# Patient Record
Sex: MALE | Race: WHITE
[De-identification: names, ages, dates, MRNs, and addresses within clinical notes are randomized per-mention and may not be internally consistent; named-entity substitution may affect disease eponyms.]

---

## 2021-01-12 ENCOUNTER — HOSPITAL ENCOUNTER (OUTPATIENT)
Dept: HOSPITAL 79 - CT | Age: 66
End: 2021-01-12
Attending: NURSE PRACTITIONER
Payer: MEDICARE

## 2021-01-12 DIAGNOSIS — N20.0: ICD-10-CM

## 2021-01-12 DIAGNOSIS — I71.4: Primary | ICD-10-CM

## 2021-05-12 ENCOUNTER — HOSPITAL ENCOUNTER (OUTPATIENT)
Dept: HOSPITAL 79 - KOH-I | Age: 66
End: 2021-05-12
Attending: FAMILY MEDICINE
Payer: MEDICARE

## 2021-05-12 DIAGNOSIS — M47.816: ICD-10-CM

## 2021-05-12 DIAGNOSIS — M47.814: ICD-10-CM

## 2021-05-12 DIAGNOSIS — M54.5: Primary | ICD-10-CM

## 2021-08-11 ENCOUNTER — HOSPITAL ENCOUNTER (INPATIENT)
Dept: HOSPITAL 79 - ER1 | Age: 66
LOS: 30 days | DRG: 870 | End: 2021-09-10
Attending: SURGERY | Admitting: EMERGENCY MEDICINE
Payer: MEDICARE

## 2021-08-11 ENCOUNTER — HOSPITAL ENCOUNTER (EMERGENCY)
Dept: HOSPITAL 79 - ER1 | Age: 66
Discharge: HOME | End: 2021-08-11
Payer: MEDICARE

## 2021-08-11 VITALS — HEIGHT: 65 IN | BODY MASS INDEX: 44.81 KG/M2 | WEIGHT: 268.96 LBS

## 2021-08-11 VITALS — HEIGHT: 63 IN | BODY MASS INDEX: 38.98 KG/M2 | WEIGHT: 220 LBS

## 2021-08-11 DIAGNOSIS — E87.0: ICD-10-CM

## 2021-08-11 DIAGNOSIS — E86.0: ICD-10-CM

## 2021-08-11 DIAGNOSIS — E03.9: ICD-10-CM

## 2021-08-11 DIAGNOSIS — N17.0: ICD-10-CM

## 2021-08-11 DIAGNOSIS — T45.515A: ICD-10-CM

## 2021-08-11 DIAGNOSIS — G93.41: ICD-10-CM

## 2021-08-11 DIAGNOSIS — E11.65: ICD-10-CM

## 2021-08-11 DIAGNOSIS — D63.1: ICD-10-CM

## 2021-08-11 DIAGNOSIS — T38.0X5A: ICD-10-CM

## 2021-08-11 DIAGNOSIS — J12.82: ICD-10-CM

## 2021-08-11 DIAGNOSIS — I12.9: ICD-10-CM

## 2021-08-11 DIAGNOSIS — I08.1: ICD-10-CM

## 2021-08-11 DIAGNOSIS — Z79.890: ICD-10-CM

## 2021-08-11 DIAGNOSIS — A41.89: ICD-10-CM

## 2021-08-11 DIAGNOSIS — J15.6: ICD-10-CM

## 2021-08-11 DIAGNOSIS — D69.3: ICD-10-CM

## 2021-08-11 DIAGNOSIS — R65.20: ICD-10-CM

## 2021-08-11 DIAGNOSIS — U07.1: ICD-10-CM

## 2021-08-11 DIAGNOSIS — E66.01: ICD-10-CM

## 2021-08-11 DIAGNOSIS — F41.9: ICD-10-CM

## 2021-08-11 DIAGNOSIS — N40.0: ICD-10-CM

## 2021-08-11 DIAGNOSIS — J80: ICD-10-CM

## 2021-08-11 DIAGNOSIS — A41.59: Primary | ICD-10-CM

## 2021-08-11 DIAGNOSIS — L89.152: ICD-10-CM

## 2021-08-11 DIAGNOSIS — C90.30: ICD-10-CM

## 2021-08-11 DIAGNOSIS — Z82.49: ICD-10-CM

## 2021-08-11 DIAGNOSIS — E87.8: ICD-10-CM

## 2021-08-11 DIAGNOSIS — K92.2: ICD-10-CM

## 2021-08-11 DIAGNOSIS — E11.22: ICD-10-CM

## 2021-08-11 DIAGNOSIS — J45.909: ICD-10-CM

## 2021-08-11 DIAGNOSIS — Z98.890: ICD-10-CM

## 2021-08-11 DIAGNOSIS — R80.9: ICD-10-CM

## 2021-08-11 DIAGNOSIS — E44.1: ICD-10-CM

## 2021-08-11 DIAGNOSIS — G47.33: ICD-10-CM

## 2021-08-11 DIAGNOSIS — K56.7: ICD-10-CM

## 2021-08-11 DIAGNOSIS — E78.5: ICD-10-CM

## 2021-08-11 DIAGNOSIS — K75.9: ICD-10-CM

## 2021-08-11 DIAGNOSIS — Z83.3: ICD-10-CM

## 2021-08-11 DIAGNOSIS — Z98.49: ICD-10-CM

## 2021-08-11 DIAGNOSIS — U07.1: Primary | ICD-10-CM

## 2021-08-11 DIAGNOSIS — G40.909: ICD-10-CM

## 2021-08-11 DIAGNOSIS — E87.2: ICD-10-CM

## 2021-08-11 DIAGNOSIS — E87.5: ICD-10-CM

## 2021-08-11 DIAGNOSIS — Z79.82: ICD-10-CM

## 2021-08-11 DIAGNOSIS — Z66: ICD-10-CM

## 2021-08-11 DIAGNOSIS — D69.59: ICD-10-CM

## 2021-08-11 DIAGNOSIS — N18.30: ICD-10-CM

## 2021-08-11 DIAGNOSIS — Z90.49: ICD-10-CM

## 2021-08-11 DIAGNOSIS — R53.81: ICD-10-CM

## 2021-08-11 DIAGNOSIS — Z79.4: ICD-10-CM

## 2021-08-11 DIAGNOSIS — Z86.73: ICD-10-CM

## 2021-08-11 LAB
BUN/CREATININE RATIO: 20 (ref 0–10)
HGB BLD-MCNC: 14.5 GM/DL (ref 14–17.5)
RED BLOOD COUNT: 4.47 M/UL (ref 4.2–5.5)
WHITE BLOOD COUNT: 8.3 K/UL (ref 4.5–11)

## 2021-08-11 PROCEDURE — C1752 CATH,HEMODIALYSIS,SHORT-TERM: HCPCS

## 2021-08-11 PROCEDURE — A6212 FOAM DRG <=16 SQ IN W/BORDER: HCPCS

## 2021-08-11 PROCEDURE — P9047 ALBUMIN (HUMAN), 25%, 50ML: HCPCS

## 2021-08-11 PROCEDURE — U0002 COVID-19 LAB TEST NON-CDC: HCPCS

## 2021-08-11 PROCEDURE — C1769 GUIDE WIRE: HCPCS

## 2021-08-11 PROCEDURE — P9016 RBC LEUKOCYTES REDUCED: HCPCS

## 2021-08-11 PROCEDURE — C9113 INJ PANTOPRAZOLE SODIUM, VIA: HCPCS

## 2021-08-12 LAB
BUN/CREATININE RATIO: 26 (ref 0–10)
HGB BLD-MCNC: 12.5 GM/DL (ref 14–17.5)
RED BLOOD COUNT: 4.02 M/UL (ref 4.2–5.5)
WHITE BLOOD COUNT: 14.7 K/UL (ref 4.5–11)

## 2021-08-12 PROCEDURE — XW033E5 INTRODUCTION OF REMDESIVIR ANTI-INFECTIVE INTO PERIPHERAL VEIN, PERCUTANEOUS APPROACH, NEW TECHNOLOGY GROUP 5: ICD-10-PCS | Performed by: INTERNAL MEDICINE

## 2021-08-12 PROCEDURE — 3E0436Z INTRODUCTION OF NUTRITIONAL SUBSTANCE INTO CENTRAL VEIN, PERCUTANEOUS APPROACH: ICD-10-PCS | Performed by: INTERNAL MEDICINE

## 2021-08-12 PROCEDURE — 3E0333Z INTRODUCTION OF ANTI-INFLAMMATORY INTO PERIPHERAL VEIN, PERCUTANEOUS APPROACH: ICD-10-PCS | Performed by: INTERNAL MEDICINE

## 2021-08-13 LAB
BUN/CREATININE RATIO: 33 (ref 0–10)
HGB BLD-MCNC: 12.7 GM/DL (ref 14–17.5)
RED BLOOD COUNT: 3.97 M/UL (ref 4.2–5.5)
WHITE BLOOD COUNT: 14.6 K/UL (ref 4.5–11)

## 2021-08-14 LAB
BUN/CREATININE RATIO: 36 (ref 0–10)
HGB BLD-MCNC: 14.4 GM/DL (ref 14–17.5)
RED BLOOD COUNT: 4.45 M/UL (ref 4.2–5.5)
WHITE BLOOD COUNT: 17.4 K/UL (ref 4.5–11)

## 2021-08-15 LAB
BUN/CREATININE RATIO: 37 (ref 0–10)
HGB BLD-MCNC: 14 GM/DL (ref 14–17.5)
RED BLOOD COUNT: 4.37 M/UL (ref 4.2–5.5)
WHITE BLOOD COUNT: 12.6 K/UL (ref 4.5–11)

## 2021-08-15 PROCEDURE — B24BZZZ ULTRASONOGRAPHY OF HEART WITH AORTA: ICD-10-PCS | Performed by: INTERNAL MEDICINE

## 2021-08-16 LAB
BUN/CREATININE RATIO: 42 (ref 0–10)
HGB BLD-MCNC: 13.8 GM/DL (ref 14–17.5)
RED BLOOD COUNT: 4.28 M/UL (ref 4.2–5.5)
WHITE BLOOD COUNT: 14.8 K/UL (ref 4.5–11)

## 2021-08-16 PROCEDURE — 5A1955Z RESPIRATORY VENTILATION, GREATER THAN 96 CONSECUTIVE HOURS: ICD-10-PCS | Performed by: INTERNAL MEDICINE

## 2021-08-16 PROCEDURE — 3E033XZ INTRODUCTION OF VASOPRESSOR INTO PERIPHERAL VEIN, PERCUTANEOUS APPROACH: ICD-10-PCS | Performed by: INTERNAL MEDICINE

## 2021-08-16 PROCEDURE — 0BH18EZ INSERTION OF ENDOTRACHEAL AIRWAY INTO TRACHEA, VIA NATURAL OR ARTIFICIAL OPENING ENDOSCOPIC: ICD-10-PCS | Performed by: INTERNAL MEDICINE

## 2021-08-16 PROCEDURE — XW033G5 INTRODUCTION OF SARILUMAB INTO PERIPHERAL VEIN, PERCUTANEOUS APPROACH, NEW TECHNOLOGY GROUP 5: ICD-10-PCS | Performed by: INTERNAL MEDICINE

## 2021-08-16 PROCEDURE — 05HM33Z INSERTION OF INFUSION DEVICE INTO RIGHT INTERNAL JUGULAR VEIN, PERCUTANEOUS APPROACH: ICD-10-PCS | Performed by: INTERNAL MEDICINE

## 2021-08-16 PROCEDURE — B543ZZA ULTRASONOGRAPHY OF RIGHT JUGULAR VEINS, GUIDANCE: ICD-10-PCS | Performed by: INTERNAL MEDICINE

## 2021-08-17 LAB
BUN/CREATININE RATIO: 43 (ref 0–10)
HGB BLD-MCNC: 14.7 GM/DL (ref 14–17.5)
RED BLOOD COUNT: 4.57 M/UL (ref 4.2–5.5)
WHITE BLOOD COUNT: 10.1 K/UL (ref 4.5–11)

## 2021-08-17 NOTE — NUR
8/16 2130 pt OG tube leaking when attempting to give medications and flush it.
replaced OG tube and ordered KUB to confirm placement.

## 2021-08-18 LAB
BUN/CREATININE RATIO: 43 (ref 0–10)
HGB BLD-MCNC: 14 GM/DL (ref 14–17.5)
RED BLOOD COUNT: 4.4 M/UL (ref 4.2–5.5)
WHITE BLOOD COUNT: 8.4 K/UL (ref 4.5–11)

## 2021-08-19 LAB
BUN/CREATININE RATIO: 54 (ref 0–10)
HGB BLD-MCNC: 13.4 GM/DL (ref 14–17.5)
RED BLOOD COUNT: 4.2 M/UL (ref 4.2–5.5)
WHITE BLOOD COUNT: 9.9 K/UL (ref 4.5–11)

## 2021-08-20 LAB
BUN/CREATININE RATIO: 62 (ref 0–10)
HGB BLD-MCNC: 13.1 GM/DL (ref 14–17.5)
RED BLOOD COUNT: 4.13 M/UL (ref 4.2–5.5)
WHITE BLOOD COUNT: 13.1 K/UL (ref 4.5–11)

## 2021-08-21 LAB
BUN/CREATININE RATIO: 73 (ref 0–10)
HGB BLD-MCNC: 13.2 GM/DL (ref 14–17.5)
RED BLOOD COUNT: 4.16 M/UL (ref 4.2–5.5)
WHITE BLOOD COUNT: 16.8 K/UL (ref 4.5–11)

## 2021-08-22 LAB
BUN/CREATININE RATIO: 85 (ref 0–10)
HGB BLD-MCNC: 13 GM/DL (ref 14–17.5)
RED BLOOD COUNT: 4.03 M/UL (ref 4.2–5.5)
WHITE BLOOD COUNT: 15 K/UL (ref 4.5–11)

## 2021-08-23 LAB
BUN/CREATININE RATIO: 81 (ref 0–10)
HGB BLD-MCNC: 13.4 GM/DL (ref 14–17.5)
RED BLOOD COUNT: 4.24 M/UL (ref 4.2–5.5)
WHITE BLOOD COUNT: 13.2 K/UL (ref 4.5–11)

## 2021-08-24 LAB
BUN/CREATININE RATIO: 92 (ref 0–10)
HGB BLD-MCNC: 13.7 GM/DL (ref 14–17.5)
RED BLOOD COUNT: 4.17 M/UL (ref 4.2–5.5)
WHITE BLOOD COUNT: 9 K/UL (ref 4.5–11)

## 2021-08-24 PROCEDURE — 8E0ZXY6 ISOLATION: ICD-10-PCS | Performed by: INTERNAL MEDICINE

## 2021-08-25 LAB
BUN/CREATININE RATIO: 91 (ref 0–10)
HGB BLD-MCNC: 13.9 GM/DL (ref 14–17.5)
RED BLOOD COUNT: 4.29 M/UL (ref 4.2–5.5)
WHITE BLOOD COUNT: 8.6 K/UL (ref 4.5–11)

## 2021-08-26 LAB
BUN/CREATININE RATIO: 81 (ref 0–10)
HGB BLD-MCNC: 13.9 GM/DL (ref 14–17.5)
RED BLOOD COUNT: 4.24 M/UL (ref 4.2–5.5)
WHITE BLOOD COUNT: 8.6 K/UL (ref 4.5–11)

## 2021-08-27 LAB
BUN/CREATININE RATIO: 73 (ref 0–10)
HGB BLD-MCNC: 13.9 GM/DL (ref 14–17.5)
RED BLOOD COUNT: 4.36 M/UL (ref 4.2–5.5)
WHITE BLOOD COUNT: 5.5 K/UL (ref 4.5–11)

## 2021-08-28 LAB
BUN/CREATININE RATIO: 73 (ref 0–10)
HGB BLD-MCNC: 14.2 GM/DL (ref 14–17.5)
RED BLOOD COUNT: 4.41 M/UL (ref 4.2–5.5)
WHITE BLOOD COUNT: 5.3 K/UL (ref 4.5–11)

## 2021-08-29 LAB
BUN/CREATININE RATIO: 70 (ref 0–10)
HGB BLD-MCNC: 15 GM/DL (ref 14–17.5)
RED BLOOD COUNT: 4.67 M/UL (ref 4.2–5.5)
WHITE BLOOD COUNT: 7.5 K/UL (ref 4.5–11)

## 2021-08-30 LAB
BUN/CREATININE RATIO: 73 (ref 0–10)
HGB BLD-MCNC: 14.3 GM/DL (ref 14–17.5)
RED BLOOD COUNT: 4.45 M/UL (ref 4.2–5.5)
WHITE BLOOD COUNT: 11.8 K/UL (ref 4.5–11)

## 2021-08-31 LAB
BUN/CREATININE RATIO: 64 (ref 0–10)
HGB BLD-MCNC: 15.4 GM/DL (ref 14–17.5)
RED BLOOD COUNT: 4.74 M/UL (ref 4.2–5.5)
WHITE BLOOD COUNT: 12.4 K/UL (ref 4.5–11)

## 2021-09-01 LAB
BUN/CREATININE RATIO: 47 (ref 0–10)
HEP C VIRUS AB: 0.2 (ref 0–0.9)
HGB BLD-MCNC: 14.8 GM/DL (ref 14–17.5)
RED BLOOD COUNT: 4.59 M/UL (ref 4.2–5.5)
WHITE BLOOD COUNT: 11.8 K/UL (ref 4.5–11)

## 2021-09-01 PROCEDURE — 4A10X4Z MONITORING OF CENTRAL NERVOUS ELECTRICAL ACTIVITY, EXTERNAL APPROACH: ICD-10-PCS | Performed by: EMERGENCY MEDICINE

## 2021-09-02 LAB
BUN/CREATININE RATIO: 41 (ref 0–10)
HGB BLD-MCNC: 14.2 GM/DL (ref 14–17.5)
RED BLOOD COUNT: 4.42 M/UL (ref 4.2–5.5)
WHITE BLOOD COUNT: 8.1 K/UL (ref 4.5–11)

## 2021-09-03 LAB
BUN/CREATININE RATIO: 43 (ref 0–10)
HGB BLD-MCNC: 11.5 GM/DL (ref 14–17.5)
RED BLOOD COUNT: 3.74 M/UL (ref 4.2–5.5)
WHITE BLOOD COUNT: 6.1 K/UL (ref 4.5–11)

## 2021-09-04 LAB
ANTI-DSDNA ANTIBODIES: <1 IU/ML (ref 0–9)
ANTISTREPTOLYSIN O AB: <20 IU/ML (ref 0–200)
BUN/CREATININE RATIO: 39 (ref 0–10)
COMPLEMENT C3, SERUM: 99 MG/DL (ref 82–167)
COMPLEMENT C4, SERUM: 15 MG/DL (ref 12–38)
HEP C VIRUS AB: <0.1 (ref 0–0.9)
HGB BLD-MCNC: 10 GM/DL (ref 14–17.5)
RED BLOOD COUNT: 3.26 M/UL (ref 4.2–5.5)
WHITE BLOOD COUNT: 7.9 K/UL (ref 4.5–11)

## 2021-09-05 LAB
BUN/CREATININE RATIO: 41 (ref 0–10)
HGB BLD-MCNC: 10.1 GM/DL (ref 14–17.5)
RED BLOOD COUNT: 3.24 M/UL (ref 4.2–5.5)
WHITE BLOOD COUNT: 9.9 K/UL (ref 4.5–11)

## 2021-09-06 LAB
BUN/CREATININE RATIO: 40 (ref 0–10)
HEPARIN INDUCED PLATELET AB: 0.08 OD (ref 0–0.4)
HGB BLD-MCNC: 8.9 GM/DL (ref 14–17.5)
RED BLOOD COUNT: 2.86 M/UL (ref 4.2–5.5)
WHITE BLOOD COUNT: 8.9 K/UL (ref 4.5–11)

## 2021-09-07 LAB
ADAMTS13 ACTIVITY: 27.6 % (ref 66.8–?)
ATYPICAL PANCA: (no result) TITER
BUN/CREATININE RATIO: 40 (ref 0–10)
CYTOPLASMIC (C-ANCA): (no result) TITER
HGB BLD-MCNC: 8.4 GM/DL (ref 14–17.5)
PERINUCLEAR (P-ANCA): (no result) TITER
RED BLOOD COUNT: 2.69 M/UL (ref 4.2–5.5)
WHITE BLOOD COUNT: 10.9 K/UL (ref 4.5–11)

## 2021-09-07 PROCEDURE — 5A1D80Z PERFORMANCE OF URINARY FILTRATION, PROLONGED INTERMITTENT, 6-18 HOURS PER DAY: ICD-10-PCS | Performed by: INTERNAL MEDICINE

## 2021-09-08 LAB
A/G RATIO: 1.7 (ref 0.7–1.7)
ALBUMIN: 2.9 G/DL (ref 2.9–4.4)
ALPHA-1-GLOBULIN: 0.2 G/DL (ref 0–0.4)
ALPHA-2-GLOBULIN: 0.8 G/DL (ref 0.4–1)
BETA GLOBULIN: 0.6 G/DL (ref 0.7–1.3)
BUN/CREATININE RATIO: 39 (ref 0–10)
GAMMA GLOBULIN: 0.2 G/DL (ref 0.4–1.8)
GLOBULIN, TOTAL: 1.8 G/DL (ref 2.2–3.9)
HGB BLD-MCNC: 10.1 GM/DL (ref 14–17.5)
HGB BLD-MCNC: 7.6 GM/DL (ref 14–17.5)
IMMUNOGLOBULIN G, QN, SERUM: 227 MG/DL (ref 603–1613)
IMMUNOGLOBULIN M, QN, SERUM: 51 MG/DL (ref 20–172)
PROTEIN, TOTAL, SERUM: 4.7 G/DL (ref 6–8.5)
RED BLOOD COUNT: 2.5 M/UL (ref 4.2–5.5)
WHITE BLOOD COUNT: 8.4 K/UL (ref 4.5–11)

## 2021-09-08 PROCEDURE — 5A1D80Z PERFORMANCE OF URINARY FILTRATION, PROLONGED INTERMITTENT, 6-18 HOURS PER DAY: ICD-10-PCS | Performed by: INTERNAL MEDICINE

## 2021-09-08 PROCEDURE — 30233N1 TRANSFUSION OF NONAUTOLOGOUS RED BLOOD CELLS INTO PERIPHERAL VEIN, PERCUTANEOUS APPROACH: ICD-10-PCS | Performed by: INTERNAL MEDICINE

## 2021-09-09 LAB
BUN/CREATININE RATIO: 37 (ref 0–10)
HGB BLD-MCNC: 10.1 GM/DL (ref 14–17.5)
RED BLOOD COUNT: 3.16 M/UL (ref 4.2–5.5)
WHITE BLOOD COUNT: 17.5 K/UL (ref 4.5–11)